# Patient Record
Sex: FEMALE | Race: BLACK OR AFRICAN AMERICAN | NOT HISPANIC OR LATINO | Employment: UNEMPLOYED | URBAN - METROPOLITAN AREA
[De-identification: names, ages, dates, MRNs, and addresses within clinical notes are randomized per-mention and may not be internally consistent; named-entity substitution may affect disease eponyms.]

---

## 2019-06-13 ENCOUNTER — SOCIAL WORK (OUTPATIENT)
Dept: ADMINISTRATIVE | Facility: OTHER | Age: 34
End: 2019-06-13

## 2019-06-13 NOTE — PROGRESS NOTES
SW met with pt regarding initial OB assessment. Pt stated this is her 3rd pregnancy/1-miscarriage. Pt stated lives with her boyfriend/child-4 and able to perform ADL's independently. Pt stated support system is her boyfriend/Omkar. Pt stated has medicaid([facility]). Pt stated does not have WIC. SW provide pt with information on other community resources.SW faxed and scanned pt's notification of pregnancy into epic.  No other needs identified at this time.    Nasrin Ortiz,MSW  Pager#[phone]

## 2019-06-17 PROBLEM — Z86.19 HISTORY OF CHLAMYDIA: Status: ACTIVE | Noted: 2019-06-17

## 2019-07-08 PROBLEM — O09.91 SUPERVISION OF HIGH RISK PREGNANCY IN FIRST TRIMESTER: Status: ACTIVE | Noted: 2019-07-08

## 2019-07-17 PROBLEM — O20.9 FIRST TRIMESTER BLEEDING: Status: ACTIVE | Noted: 2019-07-17

## 2019-08-02 ENCOUNTER — NURSE TRIAGE (OUTPATIENT)
Dept: ADMINISTRATIVE | Facility: CLINIC | Age: 34
End: 2019-08-02

## 2019-08-02 NOTE — TELEPHONE ENCOUNTER
12 weeks pregnant, has had a headache x 3 days, not improved with Tylenol.  Unknown blood pressure, and no sinus symptoms.  Rates it 7/10 left sided and constant, came on slowly and got progressively worse.  No availability in this clinic.  Attempted to call [facility] to see if any other OBGYN clinics/md's might have availability, but got no answer.  Gave pt the number to try again, will message Dr. Vasquez/staff and advised pt if no answer in the next couple hours she should go in to an C in the area.    Reason for Disposition   MODERATE headache (e.g., interferes with normal activities), present more than 24 hours, and unexplained    Protocols used: PREGNANCY - HEADACHE-A-[place]

## 2019-09-23 PROBLEM — D57.3 SICKLE CELL TRAIT: Status: ACTIVE | Noted: 2019-09-23

## 2019-11-20 PROBLEM — K43.6 IRREDUCIBLE VENTRAL HERNIA: Status: ACTIVE | Noted: 2019-11-20

## 2019-11-20 PROBLEM — F41.9 ANXIETY IN PREGNANCY IN SECOND TRIMESTER, ANTEPARTUM: Status: ACTIVE | Noted: 2019-11-20

## 2019-11-20 PROBLEM — O99.342 ANXIETY IN PREGNANCY IN SECOND TRIMESTER, ANTEPARTUM: Status: ACTIVE | Noted: 2019-11-20

## 2019-12-04 PROBLEM — M95.8 ABDOMINAL WALL DEFECT, ACQUIRED: Status: ACTIVE | Noted: 2019-12-04
